# Patient Record
Sex: FEMALE | Race: BLACK OR AFRICAN AMERICAN | NOT HISPANIC OR LATINO | URBAN - METROPOLITAN AREA
[De-identification: names, ages, dates, MRNs, and addresses within clinical notes are randomized per-mention and may not be internally consistent; named-entity substitution may affect disease eponyms.]

---

## 2020-02-27 ENCOUNTER — HOSPITAL ENCOUNTER (OUTPATIENT)
Dept: URGENT CARE | Facility: CLINIC | Age: 65
Discharge: HOME | End: 2020-02-27
Attending: NURSE PRACTITIONER

## 2020-03-31 ENCOUNTER — CONVERSION ENCOUNTER (OUTPATIENT)
Dept: FAMILY MEDICINE CLINIC | Facility: CLINIC | Age: 65
End: 2020-03-31

## 2020-03-31 ENCOUNTER — OFFICE VISIT CONVERTED (OUTPATIENT)
Dept: FAMILY MEDICINE CLINIC | Facility: CLINIC | Age: 65
End: 2020-03-31
Attending: FAMILY MEDICINE

## 2020-03-31 ENCOUNTER — HOSPITAL ENCOUNTER (OUTPATIENT)
Dept: OTHER | Facility: HOSPITAL | Age: 65
Discharge: HOME | End: 2020-03-31
Attending: FAMILY MEDICINE

## 2020-03-31 LAB
25(OH)D3 SERPL-MCNC: 34 NG/ML (ref 30–100)
ALBUMIN SERPL-MCNC: 4.2 G/DL (ref 3.5–5)
ALBUMIN/GLOB SERPL: 1.4 {RATIO} (ref 1.4–2.6)
ALP SERPL-CCNC: 84 U/L (ref 43–160)
ALT SERPL-CCNC: 10 U/L (ref 10–40)
ANION GAP SERPL CALC-SCNC: 16 MMOL/L (ref 8–19)
AST SERPL-CCNC: 21 U/L (ref 15–50)
BASOPHILS # BLD AUTO: 0.01 10*3/UL (ref 0–0.2)
BASOPHILS NFR BLD AUTO: 0.1 % (ref 0–3)
BILIRUB SERPL-MCNC: 0.26 MG/DL (ref 0.2–1.3)
BUN SERPL-MCNC: 14 MG/DL (ref 5–25)
BUN/CREAT SERPL: 14 {RATIO} (ref 6–20)
CALCIUM SERPL-MCNC: 9 MG/DL (ref 8.7–10.4)
CHLORIDE SERPL-SCNC: 103 MMOL/L (ref 99–111)
CHOLEST SERPL-MCNC: 147 MG/DL (ref 107–200)
CHOLEST/HDLC SERPL: 3.3 {RATIO} (ref 3–6)
CONV ABS IMM GRAN: 0.03 10*3/UL (ref 0–0.2)
CONV CO2: 27 MMOL/L (ref 22–32)
CONV IMMATURE GRAN: 0.4 % (ref 0–1.8)
CONV TOTAL PROTEIN: 7.3 G/DL (ref 6.3–8.2)
CREAT UR-MCNC: 1.01 MG/DL (ref 0.5–0.9)
DEPRECATED RDW RBC AUTO: 45.1 FL (ref 36.4–46.3)
EOSINOPHIL # BLD AUTO: 0.08 10*3/UL (ref 0–0.7)
EOSINOPHIL # BLD AUTO: 1 % (ref 0–7)
ERYTHROCYTE [DISTWIDTH] IN BLOOD BY AUTOMATED COUNT: 13.3 % (ref 11.7–14.4)
GFR SERPLBLD BASED ON 1.73 SQ M-ARVRAT: >60 ML/MIN/{1.73_M2}
GLOBULIN UR ELPH-MCNC: 3.1 G/DL (ref 2–3.5)
GLUCOSE SERPL-MCNC: 101 MG/DL (ref 65–99)
HCT VFR BLD AUTO: 38.4 % (ref 37–47)
HDLC SERPL-MCNC: 44 MG/DL (ref 40–60)
HGB BLD-MCNC: 12.1 G/DL (ref 12–16)
LDLC SERPL CALC-MCNC: 80 MG/DL (ref 70–100)
LYMPHOCYTES # BLD AUTO: 1.07 10*3/UL (ref 1–5)
LYMPHOCYTES NFR BLD AUTO: 13.9 % (ref 20–45)
MCH RBC QN AUTO: 28.9 PG (ref 27–31)
MCHC RBC AUTO-ENTMCNC: 31.5 G/DL (ref 33–37)
MCV RBC AUTO: 91.6 FL (ref 81–99)
MONOCYTES # BLD AUTO: 0.63 10*3/UL (ref 0.2–1.2)
MONOCYTES NFR BLD AUTO: 8.2 % (ref 3–10)
NEUTROPHILS # BLD AUTO: 5.89 10*3/UL (ref 2–8)
NEUTROPHILS NFR BLD AUTO: 76.4 % (ref 30–85)
NRBC CBCN: 0 % (ref 0–0.7)
OSMOLALITY SERPL CALC.SUM OF ELEC: 295 MOSM/KG (ref 273–304)
PLATELET # BLD AUTO: 259 10*3/UL (ref 130–400)
PMV BLD AUTO: 10.5 FL (ref 9.4–12.3)
POTASSIUM SERPL-SCNC: 4.2 MMOL/L (ref 3.5–5.3)
PTH-INTACT SERPL-MCNC: 116.6 PG/ML (ref 11.1–79.5)
RBC # BLD AUTO: 4.19 10*6/UL (ref 4.2–5.4)
SODIUM SERPL-SCNC: 142 MMOL/L (ref 135–147)
T4 FREE SERPL-MCNC: 1.5 NG/DL (ref 0.9–1.8)
TRIGL SERPL-MCNC: 116 MG/DL (ref 40–150)
TSH SERPL-ACNC: 0.3 M[IU]/L (ref 0.27–4.2)
VLDLC SERPL-MCNC: 23 MG/DL (ref 5–37)
WBC # BLD AUTO: 7.71 10*3/UL (ref 4.8–10.8)

## 2020-04-22 ENCOUNTER — HOSPITAL ENCOUNTER (OUTPATIENT)
Dept: FAMILY MEDICINE CLINIC | Facility: CLINIC | Age: 65
Discharge: HOME | End: 2020-04-22
Attending: NURSE PRACTITIONER

## 2020-04-22 ENCOUNTER — OFFICE VISIT CONVERTED (OUTPATIENT)
Dept: FAMILY MEDICINE CLINIC | Facility: CLINIC | Age: 65
End: 2020-04-22
Attending: NURSE PRACTITIONER

## 2020-04-24 LAB — BACTERIA SPEC AEROBE CULT: NORMAL

## 2020-05-13 ENCOUNTER — OFFICE VISIT CONVERTED (OUTPATIENT)
Dept: FAMILY MEDICINE CLINIC | Facility: CLINIC | Age: 65
End: 2020-05-13
Attending: FAMILY MEDICINE

## 2020-06-22 ENCOUNTER — OFFICE VISIT CONVERTED (OUTPATIENT)
Dept: FAMILY MEDICINE CLINIC | Facility: CLINIC | Age: 65
End: 2020-06-22
Attending: FAMILY MEDICINE

## 2020-07-27 ENCOUNTER — HOSPITAL ENCOUNTER (OUTPATIENT)
Dept: MAMMOGRAPHY | Facility: HOSPITAL | Age: 65
Discharge: HOME | End: 2020-07-27
Attending: FAMILY MEDICINE

## 2020-07-27 LAB
ALBUMIN SERPL-MCNC: 4.1 G/DL (ref 3.5–5)
ALP SERPL-CCNC: 74 U/L (ref 43–160)
ALT SERPL-CCNC: 9 U/L (ref 10–40)
AST SERPL-CCNC: 18 U/L (ref 15–50)
BASOPHILS # BLD AUTO: 0.02 10*3/UL (ref 0–0.2)
BASOPHILS NFR BLD AUTO: 0.3 % (ref 0–3)
BILIRUB SERPL-MCNC: 0.26 MG/DL (ref 0.2–1.3)
CONV ABS IMM GRAN: 0.02 10*3/UL (ref 0–0.2)
CONV BILI, CONJUGATED: <0.2 MG/DL (ref 0–0.6)
CONV IMMATURE GRAN: 0.3 % (ref 0–1.8)
CONV TOTAL PROTEIN: 7.4 G/DL (ref 6.3–8.2)
CONV UNCONJUGATED BILIRUBIN: 0.1 MG/DL (ref 0–1.1)
DEPRECATED RDW RBC AUTO: 42.8 FL (ref 36.4–46.3)
EOSINOPHIL # BLD AUTO: 0.07 10*3/UL (ref 0–0.7)
EOSINOPHIL # BLD AUTO: 1 % (ref 0–7)
ERYTHROCYTE [DISTWIDTH] IN BLOOD BY AUTOMATED COUNT: 13 % (ref 11.7–14.4)
HCT VFR BLD AUTO: 39.9 % (ref 37–47)
HGB BLD-MCNC: 12.6 G/DL (ref 12–16)
LYMPHOCYTES # BLD AUTO: 1.57 10*3/UL (ref 1–5)
LYMPHOCYTES NFR BLD AUTO: 23.4 % (ref 20–45)
MCH RBC QN AUTO: 28.5 PG (ref 27–31)
MCHC RBC AUTO-ENTMCNC: 31.6 G/DL (ref 33–37)
MCV RBC AUTO: 90.3 FL (ref 81–99)
MONOCYTES # BLD AUTO: 0.64 10*3/UL (ref 0.2–1.2)
MONOCYTES NFR BLD AUTO: 9.5 % (ref 3–10)
NEUTROPHILS # BLD AUTO: 4.4 10*3/UL (ref 2–8)
NEUTROPHILS NFR BLD AUTO: 65.5 % (ref 30–85)
NRBC CBCN: 0 % (ref 0–0.7)
PLATELET # BLD AUTO: 274 10*3/UL (ref 130–400)
PMV BLD AUTO: 10.6 FL (ref 9.4–12.3)
RBC # BLD AUTO: 4.42 10*6/UL (ref 4.2–5.4)
WBC # BLD AUTO: 6.72 10*3/UL (ref 4.8–10.8)

## 2020-08-03 ENCOUNTER — HOSPITAL ENCOUNTER (OUTPATIENT)
Dept: MAMMOGRAPHY | Facility: HOSPITAL | Age: 65
Discharge: HOME | End: 2020-08-03
Attending: FAMILY MEDICINE

## 2020-08-14 ENCOUNTER — HOSPITAL ENCOUNTER (OUTPATIENT)
Dept: ULTRASOUND IMAGING | Facility: HOSPITAL | Age: 65
Discharge: HOME | End: 2020-08-14
Attending: FAMILY MEDICINE

## 2020-09-18 ENCOUNTER — HOSPITAL ENCOUNTER (OUTPATIENT)
Dept: FAMILY MEDICINE CLINIC | Facility: CLINIC | Age: 65
Discharge: HOME | End: 2020-09-18
Attending: FAMILY MEDICINE

## 2020-09-18 LAB
ALBUMIN SERPL-MCNC: 4.1 G/DL (ref 3.5–5)
ALBUMIN/GLOB SERPL: 1.3 {RATIO} (ref 1.4–2.6)
ALP SERPL-CCNC: 88 U/L (ref 43–160)
ALT SERPL-CCNC: 11 U/L (ref 10–40)
ANION GAP SERPL CALC-SCNC: 14 MMOL/L (ref 8–19)
AST SERPL-CCNC: 22 U/L (ref 15–50)
BILIRUB SERPL-MCNC: 0.25 MG/DL (ref 0.2–1.3)
BUN SERPL-MCNC: 9 MG/DL (ref 5–25)
BUN/CREAT SERPL: 9 {RATIO} (ref 6–20)
CALCIUM SERPL-MCNC: 9.4 MG/DL (ref 8.7–10.4)
CHLORIDE SERPL-SCNC: 101 MMOL/L (ref 99–111)
CONV CO2: 29 MMOL/L (ref 22–32)
CONV TOTAL PROTEIN: 7.2 G/DL (ref 6.3–8.2)
CREAT UR-MCNC: 1.05 MG/DL (ref 0.5–0.9)
GFR SERPLBLD BASED ON 1.73 SQ M-ARVRAT: >60 ML/MIN/{1.73_M2}
GLOBULIN UR ELPH-MCNC: 3.1 G/DL (ref 2–3.5)
GLUCOSE SERPL-MCNC: 91 MG/DL (ref 65–99)
OSMOLALITY SERPL CALC.SUM OF ELEC: 286 MOSM/KG (ref 273–304)
POTASSIUM SERPL-SCNC: 4.5 MMOL/L (ref 3.5–5.3)
SODIUM SERPL-SCNC: 139 MMOL/L (ref 135–147)

## 2020-09-22 ENCOUNTER — OFFICE VISIT CONVERTED (OUTPATIENT)
Dept: FAMILY MEDICINE CLINIC | Facility: CLINIC | Age: 65
End: 2020-09-22
Attending: FAMILY MEDICINE

## 2020-09-30 ENCOUNTER — HOSPITAL ENCOUNTER (OUTPATIENT)
Dept: OTHER | Facility: HOSPITAL | Age: 65
Discharge: HOME | End: 2020-09-30

## 2020-09-30 LAB
ALBUMIN SERPL-MCNC: 3.7 G/DL (ref 3.5–5)
ALP SERPL-CCNC: 74 U/L (ref 43–160)
ALT SERPL-CCNC: 11 U/L (ref 10–40)
AST SERPL-CCNC: 14 U/L (ref 15–50)
BASOPHILS # BLD AUTO: 0.02 10*3/UL (ref 0–0.2)
BASOPHILS NFR BLD AUTO: 0.2 % (ref 0–3)
BILIRUB SERPL-MCNC: 0.2 MG/DL (ref 0.2–1.3)
CONV ABS IMM GRAN: 0.07 10*3/UL (ref 0–0.2)
CONV BILI, CONJUGATED: <0.2 MG/DL (ref 0–0.6)
CONV IMMATURE GRAN: 0.8 % (ref 0–1.8)
CONV TOTAL PROTEIN: 6.6 G/DL (ref 6.3–8.2)
CONV UNCONJUGATED BILIRUBIN: 0 MG/DL (ref 0–1.1)
DEPRECATED RDW RBC AUTO: 42 FL (ref 36.4–46.3)
EOSINOPHIL # BLD AUTO: 0.11 10*3/UL (ref 0–0.7)
EOSINOPHIL # BLD AUTO: 1.3 % (ref 0–7)
ERYTHROCYTE [DISTWIDTH] IN BLOOD BY AUTOMATED COUNT: 12.7 % (ref 11.7–14.4)
HCT VFR BLD AUTO: 39.3 % (ref 37–47)
HGB BLD-MCNC: 12.4 G/DL (ref 12–16)
LYMPHOCYTES # BLD AUTO: 2.08 10*3/UL (ref 1–5)
LYMPHOCYTES NFR BLD AUTO: 24.3 % (ref 20–45)
MCH RBC QN AUTO: 28.4 PG (ref 27–31)
MCHC RBC AUTO-ENTMCNC: 31.6 G/DL (ref 33–37)
MCV RBC AUTO: 90.1 FL (ref 81–99)
MONOCYTES # BLD AUTO: 0.98 10*3/UL (ref 0.2–1.2)
MONOCYTES NFR BLD AUTO: 11.5 % (ref 3–10)
NEUTROPHILS # BLD AUTO: 5.29 10*3/UL (ref 2–8)
NEUTROPHILS NFR BLD AUTO: 61.9 % (ref 30–85)
NRBC CBCN: 0 % (ref 0–0.7)
PLATELET # BLD AUTO: 316 10*3/UL (ref 130–400)
PMV BLD AUTO: 10 FL (ref 9.4–12.3)
RBC # BLD AUTO: 4.36 10*6/UL (ref 4.2–5.4)
WBC # BLD AUTO: 8.55 10*3/UL (ref 4.8–10.8)

## 2020-10-27 ENCOUNTER — OFFICE VISIT CONVERTED (OUTPATIENT)
Dept: FAMILY MEDICINE CLINIC | Facility: CLINIC | Age: 65
End: 2020-10-27
Attending: FAMILY MEDICINE

## 2021-01-20 ENCOUNTER — HOSPITAL ENCOUNTER (OUTPATIENT)
Dept: FAMILY MEDICINE CLINIC | Facility: CLINIC | Age: 66
Discharge: HOME | End: 2021-01-20
Attending: FAMILY MEDICINE

## 2021-01-20 LAB
25(OH)D3 SERPL-MCNC: 35.5 NG/ML (ref 30–100)
ALBUMIN SERPL-MCNC: 4.2 G/DL (ref 3.5–5)
ALBUMIN/GLOB SERPL: 1.4 {RATIO} (ref 1.4–2.6)
ALP SERPL-CCNC: 78 U/L (ref 43–160)
ALT SERPL-CCNC: 11 U/L (ref 10–40)
ANION GAP SERPL CALC-SCNC: 14 MMOL/L (ref 8–19)
AST SERPL-CCNC: 22 U/L (ref 15–50)
BASOPHILS # BLD AUTO: 0.02 10*3/UL (ref 0–0.2)
BASOPHILS NFR BLD AUTO: 0.3 % (ref 0–3)
BILIRUB SERPL-MCNC: 0.32 MG/DL (ref 0.2–1.3)
BUN SERPL-MCNC: 13 MG/DL (ref 5–25)
BUN/CREAT SERPL: 13 {RATIO} (ref 6–20)
CALCIUM SERPL-MCNC: 9.2 MG/DL (ref 8.7–10.4)
CHLORIDE SERPL-SCNC: 103 MMOL/L (ref 99–111)
CHOLEST SERPL-MCNC: 143 MG/DL (ref 107–200)
CHOLEST/HDLC SERPL: 3.1 {RATIO} (ref 3–6)
CONV ABS IMM GRAN: 0.02 10*3/UL (ref 0–0.2)
CONV CO2: 27 MMOL/L (ref 22–32)
CONV IMMATURE GRAN: 0.3 % (ref 0–1.8)
CONV TOTAL PROTEIN: 7.1 G/DL (ref 6.3–8.2)
CREAT UR-MCNC: 1.01 MG/DL (ref 0.5–0.9)
DEPRECATED RDW RBC AUTO: 41.1 FL (ref 36.4–46.3)
EOSINOPHIL # BLD AUTO: 0.08 10*3/UL (ref 0–0.7)
EOSINOPHIL # BLD AUTO: 1.3 % (ref 0–7)
ERYTHROCYTE [DISTWIDTH] IN BLOOD BY AUTOMATED COUNT: 12.7 % (ref 11.7–14.4)
GFR SERPLBLD BASED ON 1.73 SQ M-ARVRAT: >60 ML/MIN/{1.73_M2}
GLOBULIN UR ELPH-MCNC: 2.9 G/DL (ref 2–3.5)
GLUCOSE SERPL-MCNC: 95 MG/DL (ref 65–99)
HCT VFR BLD AUTO: 40.5 % (ref 37–47)
HDLC SERPL-MCNC: 46 MG/DL (ref 40–60)
HGB BLD-MCNC: 12.9 G/DL (ref 12–16)
LDLC SERPL CALC-MCNC: 73 MG/DL (ref 70–100)
LYMPHOCYTES # BLD AUTO: 1.21 10*3/UL (ref 1–5)
LYMPHOCYTES NFR BLD AUTO: 19.4 % (ref 20–45)
MCH RBC QN AUTO: 28.2 PG (ref 27–31)
MCHC RBC AUTO-ENTMCNC: 31.9 G/DL (ref 33–37)
MCV RBC AUTO: 88.6 FL (ref 81–99)
MONOCYTES # BLD AUTO: 0.5 10*3/UL (ref 0.2–1.2)
MONOCYTES NFR BLD AUTO: 8 % (ref 3–10)
NEUTROPHILS # BLD AUTO: 4.4 10*3/UL (ref 2–8)
NEUTROPHILS NFR BLD AUTO: 70.7 % (ref 30–85)
NRBC CBCN: 0 % (ref 0–0.7)
OSMOLALITY SERPL CALC.SUM OF ELEC: 290 MOSM/KG (ref 273–304)
PLATELET # BLD AUTO: 277 10*3/UL (ref 130–400)
PMV BLD AUTO: 11.4 FL (ref 9.4–12.3)
POTASSIUM SERPL-SCNC: 4.1 MMOL/L (ref 3.5–5.3)
RBC # BLD AUTO: 4.57 10*6/UL (ref 4.2–5.4)
SODIUM SERPL-SCNC: 140 MMOL/L (ref 135–147)
T4 FREE SERPL-MCNC: 1.6 NG/DL (ref 0.9–1.8)
TRIGL SERPL-MCNC: 118 MG/DL (ref 40–150)
TSH SERPL-ACNC: 0.06 M[IU]/L (ref 0.27–4.2)
VLDLC SERPL-MCNC: 24 MG/DL (ref 5–37)
WBC # BLD AUTO: 6.23 10*3/UL (ref 4.8–10.8)

## 2021-01-22 LAB
A FUMIGATUS AB SER QL ID: <0.1 K[IU]/ML
AMER SYCAMORE IGE QN: <0.1 K[IU]/ML
BERMUDA GRASS IGE QN: <0.1 K[IU]/ML (ref 0–0.35)
BOXELDER IGE QN: <0.1 K[IU]/ML
CALIF WALNUT POLN IGE QN: <0.1 K[IU]/ML (ref 0–0.35)
CAT DANDER IGG QN: <0.1 K[IU]/ML (ref 0–0.35)
CLADOSPORIUM IGE: <0.1 K[IU]/ML
CMN PIGWEED IGE QN: <0.1 K[IU]/ML
COMMON RAGWEED IGE QN: <0.1 K[IU]/ML (ref 0–0.35)
COTTONWOOD IGE QN: <0.1 K[IU]/ML
D FARINAE IGE QN: <0.1 K[IU]/ML (ref 0–0.35)
D PTERONYSS IGE QN: <0.1 K[IU]/ML (ref 0–0.35)
DOG DANDER IGE QN: <0.1 K[IU]/ML (ref 0–0.35)
GOOSEFOOT IGE QN: <0.1 K[IU]/ML (ref 0–0.35)
IGE SERPL-ACNC: 42 K[IU]/ML (ref 0–24)
IMMUNOCAP RESULT: ABNORMAL (ref 0–0)
JOHNSON GRASS IGE QN: <0.1 K[IU]/ML (ref 0–0.35)
MEADOW FESCUE IGE QN: <0.1 K[IU]/ML (ref 0–0.35)
MOLD IGE: <0.1 K[IU]/ML (ref 0–0.35)
MOUSE URINE PROT IGE QN: <0.1 K[IU]/ML
MT JUNIPER IGE QN: <0.1 K[IU]/ML
OAK DUST IGE QN: <0.1 K[IU]/ML (ref 0–0.35)
P NOTATUM IGE QN: <0.1 K[IU]/ML
PECAN/HICK TREE IGE QN: <0.1 K[IU]/ML (ref 0–0.35)
ROACH IGE QN: <0.1 K[IU]/ML (ref 0–0.35)
TIMOTHY IGE QN: <0.1 K[IU]/ML
WHITE ASH IGE QN: <0.1 K[IU]/ML
WHITE BIRCH IGE QN: <0.1 K[IU]/ML (ref 0–0.35)
WHITE ELM IGE QN: <0.1 K[IU]/ML (ref 0–0.35)
WHITE MULBERRY IGE QN: <0.1 K[IU]/ML

## 2021-01-25 ENCOUNTER — OFFICE VISIT CONVERTED (OUTPATIENT)
Dept: FAMILY MEDICINE CLINIC | Facility: CLINIC | Age: 66
End: 2021-01-25
Attending: FAMILY MEDICINE

## 2021-01-25 ENCOUNTER — CONVERSION ENCOUNTER (OUTPATIENT)
Dept: FAMILY MEDICINE CLINIC | Facility: CLINIC | Age: 66
End: 2021-01-25

## 2021-01-25 ENCOUNTER — HOSPITAL ENCOUNTER (OUTPATIENT)
Dept: OTHER | Facility: HOSPITAL | Age: 66
Discharge: HOME | End: 2021-01-25
Attending: STUDENT IN AN ORGANIZED HEALTH CARE EDUCATION/TRAINING PROGRAM

## 2021-01-25 LAB
ALBUMIN SERPL-MCNC: 4 G/DL (ref 3.5–5)
ALP SERPL-CCNC: 76 U/L (ref 43–160)
ALT SERPL-CCNC: 9 U/L (ref 10–40)
AST SERPL-CCNC: 18 U/L (ref 15–50)
BASOPHILS # BLD AUTO: 0.02 10*3/UL (ref 0–0.2)
BASOPHILS NFR BLD AUTO: 0.3 % (ref 0–3)
BILIRUB SERPL-MCNC: 0.23 MG/DL (ref 0.2–1.3)
CONV ABS IMM GRAN: 0.02 10*3/UL (ref 0–0.2)
CONV BILI, CONJUGATED: <0.2 MG/DL (ref 0–0.6)
CONV IMMATURE GRAN: 0.3 % (ref 0–1.8)
CONV TOTAL PROTEIN: 6.8 G/DL (ref 6.3–8.2)
CONV UNCONJUGATED BILIRUBIN: 0 MG/DL (ref 0–1.1)
DEPRECATED RDW RBC AUTO: 42.1 FL (ref 36.4–46.3)
EOSINOPHIL # BLD AUTO: 0.11 10*3/UL (ref 0–0.7)
EOSINOPHIL # BLD AUTO: 1.6 % (ref 0–7)
ERYTHROCYTE [DISTWIDTH] IN BLOOD BY AUTOMATED COUNT: 12.8 % (ref 11.7–14.4)
HCT VFR BLD AUTO: 38.7 % (ref 37–47)
HGB BLD-MCNC: 12.1 G/DL (ref 12–16)
LYMPHOCYTES # BLD AUTO: 1.57 10*3/UL (ref 1–5)
LYMPHOCYTES NFR BLD AUTO: 23.1 % (ref 20–45)
MCH RBC QN AUTO: 28.3 PG (ref 27–31)
MCHC RBC AUTO-ENTMCNC: 31.3 G/DL (ref 33–37)
MCV RBC AUTO: 90.4 FL (ref 81–99)
MONOCYTES # BLD AUTO: 0.71 10*3/UL (ref 0.2–1.2)
MONOCYTES NFR BLD AUTO: 10.5 % (ref 3–10)
NEUTROPHILS # BLD AUTO: 4.36 10*3/UL (ref 2–8)
NEUTROPHILS NFR BLD AUTO: 64.2 % (ref 30–85)
NRBC CBCN: 0 % (ref 0–0.7)
PLATELET # BLD AUTO: 274 10*3/UL (ref 130–400)
PMV BLD AUTO: 11 FL (ref 9.4–12.3)
RBC # BLD AUTO: 4.28 10*6/UL (ref 4.2–5.4)
WBC # BLD AUTO: 6.79 10*3/UL (ref 4.8–10.8)

## 2021-03-26 ENCOUNTER — HOSPITAL ENCOUNTER (OUTPATIENT)
Dept: FAMILY MEDICINE CLINIC | Facility: CLINIC | Age: 66
Discharge: HOME | End: 2021-03-26
Attending: FAMILY MEDICINE

## 2021-03-26 LAB
T4 FREE SERPL-MCNC: 1.2 NG/DL (ref 0.9–1.8)
TSH SERPL-ACNC: 0.38 M[IU]/L (ref 0.27–4.2)

## 2021-05-10 NOTE — H&P
History and Physical      Patient Name: Marija Miller   Patient ID: [id number]   Sex: Female   YOB: 1955        Visit Date: March 31, 2020    Provider: Nathan Casey DO   Location: [facility]   Location Address: [address] [place] [address], [address] [place] [address]  [id number]   Location Phone: [phone]          Chief Complaint  · establish care      History Of Present Illness  Marija Miller is a 65 year old /Black female who presents for evaluation and treatment of:      Patient presents today to establish care.  Her past medical history is significant for hypothyroidism, hyperparathyroidism, vitamin D deficiency, rheumatoid arthritis, acid reflux, hypercholesterolemia, and gallstones.  She also has had a couple recent ear infections within the past few months.  She reports that her right ear hurts and feels full.  She denies any fever or chills.  She was diagnosed with otitis media last month and placed on amoxicillin which has helped.  She is requesting referral for rheumatoid arthritis.  She does travel a lot with her family who is .  Her daughter and son-in-law are .  She hopefully plans to be in the [facility] for the next couple years.  She moved here in August 2019.  She has previously been in [place] and then [place].  She reports that she was diagnosed with hyperparathyroidism back in [place].  She did go see HOLDEN Osullivan locally.  She was requested to have further evaluation for hyperparathyroidism.  She has not done this yet due to concerns about cost.  She does have osteoporosis which can certainly be a consequence of untreated hyperparathyroidism.  Reviewing her labs her calcium level was normal.  She does take a calcium and vitamin D supplementation.  She does take residual night once monthly for osteoporosis.  She is supposed to have a DEXA scan this year however given the circumstances with the coronavirus we discussed holding off on routine  testing.  She does need labs.  Her last TSH was slightly elevated around 5 with a normal free T4.  She is now taking 88 mcg of Synthroid daily and tolerating it well.  She does have elevated blood pressure today.  Denies any history of hypertension.  Discussed monitoring for now.  She did have a colonoscopy in 2017 and needs a follow-up in 2022.  She does need a Pap smear in 2023.  She complains of some pain on the dorsal aspect of her left index finger that has been on and off for 2 weeks.  She denies any injury.  It is not at the MCP joint area nor the PIP joint area but in between.  No lesions noted.       Past Medical History  Allergies; Arthritis; Cervical cancer screening; Gall Stones; Hyperlipemia; Thyroid Problems         Past Surgical History  Colonoscopy         Medication List  atorvastatin 20 mg oral tablet; Calcium 500 oral; Claritin 10 mg oral tablet; lansoprazole 30 mg oral capsule,delayed release(DR/EC); risedronate 150 mg oral tablet; Synthroid 88 mcg oral tablet; Vitamin D3 25 mcg (1,000 unit) oral capsule; Xeljanz XR 11 mg oral tablet extended release 24 hr         Allergy List  SULFA (SULFONAMIDES)       Allergies Reconciled  Social History  Tobacco (Former)         Review of Systems     General: Denies any fever, chills, weight changes, or night sweats  HEENT: Seasonal allergies  Cardiovascular: Denies any chest pain or palpitations  respiratory: Denies any cough or wheezing. Denies any shortness of breath  Gastrointestinal: Acid reflux  Extremities: Denies any edema  Psychiatric: Denies any changes in mood or affect  Neurologic: Denies any neurologic deficits  skin: Denies any rashes or lesions.  endocrine: Denies any weight loss, fever, night sweats  Musculoskeletal: As described above       Vitals  Date Time BP Position Site L\R Cuff Size HR RR TEMP (F) WT  HT  BMI kg/m2 BSA m2 O2 Sat        03/31/2020 09:12 /84 Sitting    92 - R  97.9 189lbs 6oz 5'   36.98 1.91 96 %          Physical  Examination     General: AAO 3, no acute distress, pleasant  HEENT: Normocephalic, atraumatic, there is cerumen impaction of the right EAC.  This was attempted to be removed by irrigation per nursing staff however patient had some dizziness and did not tolerate well.  Some but not always removed.  The left EAC is intact with the TM intact as well.  No signs of infection  Cardiovascular: Regular rate and rhythm without appreciable murmur  Respiratory: Clear to auscultation bilaterally no RRW  Gastrointestinal: Soft nontender nondistended with bowel sounds present  Musculoskeletal: Left index finger does not demonstrate any abnormal lesions.  No palpable abnormality.  She has good range of motion of the MCP joint and the PIP joint of the index finger.  extremities: No clubbing, cyanosis or edema  Neurologic: CN II through XII grossly intact   Psychiatric: Normal mood and affect               Assessment  · Osteoporosis     733.00/M81.0  · Vitamin D deficiency     268.9/E55.9  · Screening for depression     V79.0/Z13.89  · Need for influenza vaccination     V04.81/Z23  · Rheumatoid arthritis     714.0/M06.9  · Hyperparathyroidism     252.00/E21.3  · HLD (hyperlipidemia)     272.4/E78.5  · Medication monitoring encounter     V58.83/Z51.81  · Impacted cerumen of right ear     380.4/H61.21  · Hypothyroid     244.9/E03.9  · Elevated BP without diagnosis of hypertension     796.2/R03.0  · Finger pain, left     729.5/M79.645    Problems Reconciled  Plan  · Orders  o CBC with Auto Diff [facility] (57751) - 714.0/M06.9, V58.83/Z51.81 - 03/31/2020  o CMP [facility] (72912) - 714.0/M06.9, V58.83/Z51.81 - 03/31/2020  o Lipid Panel [facility] (07985) - 272.4/E78.5 - 03/31/2020  o Thyroid Profile (55959, 19623, THYII) - 244.9/E03.9 - 03/31/2020  o Vitamin D (25-Hydroxy) Level (46923) - 268.9/E55.9 - 03/31/2020  o ACO-39: Current medications updated and reviewed () - - 03/31/2020  o ACO-18: Negative screen for clinical depression using a  standardized tool () - V79.0/Z13.89 - 03/31/2020  o RHEUMATOLOGY CONSULTATION (RHEUM) - 714.0/M06.9 - 03/31/2020   [facility] [address] Elsi Reyes [address], [address], [place] [address] Phone: [phone]  Fax: [phone]  o PTH (48419) - 252.00/E21.3 - 03/31/2020  o Cerumen Removal by MA or Nurse, Unilateral [facility] (08637) - - 03/31/2020   Right ear irrigation with hydrogen peroxide and water. Visualized tympanic membrane. Performed by Heidy Vazquez  · Medications  o Voltaren 1 % topical gel   SIG: apply 2 grams to the affected area(s) by topical route 4 times per day   DISP: (1) 100 gm tube with 0 refills  Prescribed on 03/31/2020     o Medications have been Reconciled  o Transition of Care or Provider Policy  · Instructions  o Depression Screen completed and scanned into the EMR under the designated folder within the patient's documents.  o Today's PHQ-9 result is _0__  o Patient was educated/instructed on their diagnosis, treatment and medications prior to discharge from the clinic today.  o Patient instructed to seek medical attention urgently for new or worsening symptoms.  o Call the office with any concerns or questions.  o I discussed with patient her diagnosis and history of hyperparathyroidism. I will check calcium level as well as repeat her PTH. If it is still elevated I would certainly encourage her to follow-up with ENT. She does have cerumen that was partially removed today but not completely. I instructed her to use hydroperoxide and water to help with taking care of the cerumen. If she still has issues she may follow-up with ENT as well to have cerumen removed. I do not see signs of otitis media today in the left ear. I have made the rheumatology consultation as requested per patient. I will give her some Voltaren gel to see if this helps improve her index finger pain symptoms which is on and off. If symptoms continue to persist may consider imaging at that  time however no reported source of injury.  o Depression screening has been reviewed today and it is negative.  · Disposition  o Call or Return if symptoms worsen or persist.  o Follow Up in 1 month.            Electronically Signed by: Nathan Casey DO - on March 31, 2020 02:30:51 PM

## 2021-05-12 NOTE — PROGRESS NOTES
Progress Note      Patient Name: Marija Miller   Patient ID: [id number]   Sex: Female   YOB: 1955        Visit Date: April 22, 2020    Provider: HOLDEN Young   Location: [facility]   Location Address: [address] [place] [address], [address] [place] [address]  [id number]   Location Phone: [phone]          Chief Complaint  · THROAT PAIN  · POSS L EAR INFECTION      History Of Present Illness  Marija Miller is a 65 year old /Black female who presents for evaluation and treatment of:      Presents today for an acute visit for sore throat and left ear pain.  She reports having a sore throat x2 days.  She has sinus pressure and a postnasal drip.  Denies fever, chills, body aches.  She reports having left ear pain.  She was seen approximately 1 month ago with left ear pain which appeared normal and has been previously treated for left otitis media at other clinics.  She has seasonal allergies.  Has been taking Claritin 10 mg for years.       Past Medical History  Allergies; Arthritis; Cervical cancer screening; Gall Stones; Hyperlipemia; Thyroid Problems         Past Surgical History  Colonoscopy         Medication List  atorvastatin 20 mg oral tablet; Calcium 500 oral; lansoprazole 30 mg oral capsule,delayed release(DR/EC); risedronate 150 mg oral tablet; Synthroid 88 mcg oral tablet; Vitamin D3 25 mcg (1,000 unit) oral capsule; Voltaren 1 % topical gel; Xeljanz XR 11 mg oral tablet extended release 24 hr         Allergy List  SULFA (SULFONAMIDES)         Social History  Tobacco (Former)         Review of Systems  · Constitutional  o Denies  o : fatigue, fever, chills, body aches, night sweats  · Eyes  o Denies  o : double vision, blurred vision  · HENT  o Admits  o : headaches, sinus pain, postnasal drip, sore throat, ear pain, ear fullness  o Denies  o : vertigo, lightheadedness, recent head injury, nasal congestion  · Cardiovascular  o Denies  o : lower extremity edema, claudication, chest  pressure, palpitations  · Respiratory  o Denies  o : shortness of breath, wheezing, cough, productive cough  · Gastrointestinal  o Admits  o : loss of appetite  o Denies  o : nausea, vomiting, diarrhea, constipation      Vitals  Date Time BP Position Site L\R Cuff Size HR RR TEMP (F) WT  HT  BMI kg/m2 BSA m2 O2 Sat        04/22/2020 04:21 /76 Sitting    93 - R  98.1 187lbs 4oz 5'   36.57 1.9 97 %          Physical Examination  · Constitutional  o Appearance  o : alert, in no acute distress  · Head and Face  o Head  o :   § Inspection  § : atraumatic, normocephalic  o Face  o :   § Inspection  § : no facial lesions  o HEENT  o : Unremarkable  · Eyes  o Conjunctivae  o : conjunctivae normal  o Sclerae  o : sclerae white  o Pupils and Irises  o : pupils equal and round, pupils reactive to light bilaterally  o Eyelids/Ocular Adnexae  o : eyelid appearance normal  · Ears, Nose, Mouth and Throat  o Ears  o :   § External Ears  § : appearance within normal limits, no lesions present  § Otoscopic Examination  § : tympanic membrane appearance within normal limits bilaterally  o Nose  o :   § External Nose  § : appearance normal  o Oral Cavity  o :   § Oral Mucosa  § : oral mucosa normal  § Lips  § : lip appearance normal  § Teeth  § : normal dentition for age  § Gums  § : gums pink, non-swollen, no bleeding present  § Tongue  § : tongue appearance normal  § Palate  § : hard palate normal, soft palate appearance normal  o Throat  o : erythema, tonsils +2  · Neck  o Inspection/Palpation  o : normal appearance, no masses or tenderness, trachea midline  o Thyroid  o : gland size normal, nontender, no nodules or masses present on palpation  · Respiratory  o Respiratory Effort  o : breathing unlabored  o Auscultation of Lungs  o : normal breath sounds  · Cardiovascular  o Heart  o :   § Auscultation of Heart  § : regular rate, normal rhythm, no murmurs present  o Peripheral Vascular System  o :   § Extremities  § : no  edema  · Lymphatic  o Neck  o : no lymphadenopathy   o Supraclavicular Nodes  o : no supraclavicular nodes          Results  · In-Office Procedures  o Lab procedure  § IOP - Rapid Strep (35730)   § Beta Strep Gp A Culture: Negative   § Internal Control Verified?: Yes       Assessment  · Allergic rhinitis due to allergen     477.9/J30.9  Change Claritin 10 mg to Allegra 180 mg daily. Start Flonase 2 sprays per nare daily.  · Pharyngitis, acute     462/J02.9  Send throat swab for culture and sensitivity. Discussed symptomatic treatment including throat lozenges and warm fluids to soothe throat. Kenalog 40 mg IM today in office.    Problems Reconciled  Plan  · Orders  o Throat culture and sensitivity (84915) - 462/J02.9 - 04/22/2020  o ACO-39: Current medications updated and reviewed () - - 04/22/2020  o 4.00 - Kenalog Injection 40mg (-7) - - 04/22/2020   Injection - Kenalog 40 mg; Dose: 40 mg; Site: Right Gluteus; Route: intramuscular; Date: 04/22/2020 17:05:42; Exp: 11/01/2021; Lot: [id number]; Mfg: AMNEAL [employer]ogMetal; TradeName: triamcinolone; Location: [facility]; Administered By: Jennifer Talbert MA; Comment: PT TOLERATED WELL, STABLE CONDITION  · Medications  o fluticasone propionate 50 mcg/actuation nasal spray,suspension   SIG: spray 2 sprays in each nostril by intranasal route once daily   DISP: (1) 9.9 ml aer w/adap with 2 refills  Prescribed on 04/22/2020     o fexofenadine 180 mg oral tablet   SIG: take 1 tablet (180 mg) by oral route once daily for 90 days   DISP: (90) tablets with 1 refills  Prescribed on 04/22/2020     o Claritin 10 mg oral tablet   SIG: take 1 tablet (10 mg) by oral route once daily   DISP: (0) tablet with 0 refills  Discontinued on 04/22/2020     o Medications have been Reconciled  o Transition of Care or Provider Policy  · Instructions  o Rest. Increase Fluids.  o Patient was educated/instructed on their diagnosis, treatment and medications prior to discharge from the clinic  today.  o Patient instructed to seek medical attention urgently for new or worsening symptoms.  o Call the office with any concerns or questions.  · Disposition  o Call or Return if symptoms worsen or persist.            Electronically Signed by: HOLDEN Young -Author on April 23, 2020 07:37:24 AM   - COVID PCR + complaining of cough  - No supplemental O2 needed; Sp02 >94% on RA w/o respiratory distress  - No role for decadron/ remdesivir at this time   - Tylenol, robitussin, Albuterol PRN   - Blood cultures NTD - COVID19 PCR + complaining of cough, malaise   - No supplemental O2 requirement   - No Decadron/ Remdesivir indicated at this time   - Tylenol, robitussin, Albuterol PRN   - Blood cultures NTD - On admission: K+ was 6 -> s/p insulin, dextrose, Lokelma   - Patient now with hypokalemia, not symptomatic, will give one dose K 20 mg po  - Follow BMP and replace as needed

## 2021-05-13 NOTE — PROGRESS NOTES
Progress Note      Patient Name: Marija Miller   Patient ID: [id number]   Sex: Female   YOB: 1955    Primary Care Provider: Nathan Casey DO   Referring Provider: Nathan Casey DO    Visit Date: October 27, 2020    Provider: Nathan Casey DO   [facility]   Location Address: [address] [place] [address], [address]  [place], [place]  [id number]   Location Phone: [phone]          Chief Complaint  · follow up er       History Of Present Illness  Marija Miller is a 65 year old /Black female who presents for evaluation and treatment of:      Patient presents today for an emergency room follow-up visit.  She reports having pain and weakness on the left side of her face and was concerned about strokelike symptoms.  She went to the emergency room on 10/25/2020.  Symptoms began 3 days prior to this.  There was concern about stroke so she did have a head CT which was unremarkable except for innumerable bilateral punctate hyperdensity seen in the subcutaneous scalp which may represent none specific foci of mineralization with tiny foreign bodies not being excluded.  There may be involvement of superficial portions of the bilateral face.  She denies any white bumps on her skin that she has noticed.  She denies any pain or discomfort in the scalp area.  She has a hard time closing her left eye.  She has left-sided weakness on her face.  She was diagnosed with Bell's palsy and placed on appropriate treatment including acyclovir as well as prednisone for 10 and 7 days, respectively.  She is taking 60 mg of prednisone daily and 405 times a day of acyclovir.  She also has over-the-counter Lacri-Lube that she is using and she has an eye patch.  She reports symptoms are slightly better but she still has blurred vision on the left side.  Discussed with her referral to ophthalmology to evaluate for left eye involvement.       Past Medical History  Allergies; Arthritis; Cervical  cancer screening; Gall Stones; Hyperlipemia; Thyroid Problems         Past Surgical History  Colonoscopy         Medication List  Allegra Allergy 60 mg oral tablet; atorvastatin 20 mg oral tablet; Calcium 500 oral; fexofenadine 180 mg oral tablet; fluticasone propionate 50 mcg/actuation nasal spray,suspension; ipratropium bromide 0.03 % nasal spray,non-aerosol; lansoprazole 30 mg oral capsule,delayed release(DR/EC); montelukast 10 mg oral tablet; prednisone 20 mg oral tablet; risedronate 150 mg oral tablet; Synthroid 88 mcg oral tablet; Vitamin D3 25 mcg (1,000 unit) oral capsule; Xeljanz XR 11 mg oral tablet extended release 24 hr         Allergy List  SULFA (SULFONAMIDES)         Social History  Tobacco (Former)         Review of Systems     Gen: Denies any fever, chills, or weight changes  HEENT: Denies any changes in vision or hearing, denies nasal congestion  Extremities: Denies edema  Psychiatric: Denies any changes in mood or affect  Neurologic: As discussed above  Skin: As discussed above       Vitals  Date Time BP Position Site L\R Cuff Size HR RR TEMP (F) WT  HT  BMI kg/m2 BSA m2 O2 Sat FR L/min FiO2        10/27/2020 08:13 /82 Sitting    93 - R  97.3 174lbs 0oz 5'   33.98 1.83 96 %            Physical Examination     General: AAO 3, no acute distress, pleasant  HEENT: Normocephalic, atraumatic  Cardiovascular: Regular rate and rhythm without appreciable murmur  Respiratory: Clear to auscultation bilaterally no RRW  Gastrointestinal: Soft nontender nondistended with bowel sounds present  extremities: No clubbing, cyanosis or edema  Neurologic: Weakness on the left side of her face.  She is able to close her left eye completely however there is weakness compared to the right side.  There is a slight facial droop on the left side as well when she smiles.  Skin: No cutaneous calcifications noted in the scalp.   Psychiatric: Normal mood and affect           Assessment  · Bell's  palsy     351.0/G51.0  · Vision changes, left eye     368.9/H53.9  Discussed with patient continue current management of Bell's palsy. We will work on referral to ophthalmology. I will see patient back for her next regularly scheduled appointment or sooner if needed.  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 She denies any discomfort with the calcifications and she was not aware of them prior to me mentioning them today. Reviewing the CT it appears that they are for the most part in the subcutaneous and are not causing symptoms. Discussed observation. I discussed with her that it may be secondary to her history of autoimmune disease, including rheumatoid arthritis which she reports is well controlled at this time.      Plan  · Orders  o ACO-39: Current medications updated and reviewed (1159F, ) - - 10/27/2020  o ACO-14: Influenza immunization administered or previously received [facility] () - - 10/27/2020  o OPHTHALMOLOGY CONSULTATION (OPHTH) - 351.0/G51.0, 368.9/H53.9 - 10/27/2020  · Medications  o Medications have been Reconciled  o Transition of Care or Provider Policy  · Instructions  o Patient was educated/instructed on their diagnosis, treatment and medications prior to discharge from the clinic today.  o Patient instructed to seek medical attention urgently for new or worsening symptoms.  o Call the office with any concerns or questions.  · Disposition  o Call or Return if symptoms worsen or persist.  o Keep next appointment            Electronically Signed by: Nathan Casey, DO -Author on October 27, 2020 08:43:06 AM

## 2021-05-13 NOTE — PROGRESS NOTES
Progress Note      Patient Name: Marija Miller   Patient ID: [id number]   Sex: Female   YOB: 1955    Primary Care Provider: Nathan Casey DO    Visit Date: June 22, 2020    Provider: Nathan Casey DO   Location: [facility]   Location Address: [address] [place] [address], [address]  [id number]   Location Phone: [phone]          Chief Complaint  · follow up      History Of Present Illness  Marija Miller is a 65 year old /Black female who presents for evaluation and treatment of:      Patient presents today for checkup.  She reports that she has seen ENT, Dr. Tineo who is not recommending surgery at this time.  She does have a history of osteoporosis however calcium level has been normal.  She is tolerating present grenade which she is taking for osteoporosis.  I will request records.  Her last calcium level was 9.0.  Plan to repeat this in 3 months when she returns for follow-up.  She does have an appointment to see her rheumatologist in [place] next week.  She does need a mammogram.  She denies having previous abnormal mammogram.  Her last Pap smear was in the last year or the year before.  She denies being sexually active.  Discussed repeating her Pap smear around age 67.  Her last colonoscopy was in 2017 and she was recommended to have follow-up in 2022 due to polyps.  She will continue to reside in [place] for the time being as she is following her daughter and son-in-law as they are both involved in the .  She reports that her left ear pain has resolved since starting Singulair and Flonase.       Past Medical History  Allergies; Arthritis; Cervical cancer screening; Gall Stones; Hyperlipemia; Thyroid Problems         Past Surgical History  Colonoscopy         Medication List  Allegra Allergy 60 mg oral tablet; atorvastatin 20 mg oral tablet; Calcium 500 oral; fexofenadine 180 mg oral tablet; fluticasone propionate 50 mcg/actuation nasal spray,suspension;  ipratropium bromide 0.03 % nasal spray,non-aerosol; lansoprazole 30 mg oral capsule,delayed release(DR/EC); montelukast 10 mg oral tablet; risedronate 150 mg oral tablet; Synthroid 88 mcg oral tablet; Vitamin D3 25 mcg (1,000 unit) oral capsule; Xeljanz XR 11 mg oral tablet extended release 24 hr         Allergy List  SULFA (SULFONAMIDES)         Social History  Tobacco (Former)         Review of Systems     General: Denies any fever, chills, weight changes, or night sweats  HEENT:  Denies any vision or hearing changes. Denies any neck tenderness. Denies any headaches. Denies nasal congestion  Cardiovascular: Denies any chest pain or palpitations  respiratory: Denies any cough or wheezing. Denies any shortness of breath  Gastrointestinal: Denies any nausea vomiting or diarrhea, Denies constipation  Extremities: Denies any edema  Psychiatric: Denies any changes in mood or affect  Neurologic: Denies any neurologic deficits  skin: Denies any rashes or lesions.  endocrine: Denies any weight loss, fever, night sweats  Musculoskeletal: Denies any weakness       Vitals  Date Time BP Position Site L\R Cuff Size HR RR TEMP (F) WT  HT  BMI kg/m2 BSA m2 O2 Sat HC       06/22/2020 08:39 /78 Sitting    79 - R  96.9 180lbs 0oz 5'   35.15 1.86 98 %          Physical Examination     General: AAO 3, no acute distress, pleasant  HEENT: Normocephalic, atraumatic  Cardiovascular: Regular rate and rhythm without appreciable murmur  Respiratory: Clear to auscultation bilaterally no RRW  Gastrointestinal: Soft nontender nondistended with bowel sounds present  extremities: No clubbing, cyanosis or edema  Neurologic: CN II through XII grossly intact   Psychiatric: Normal mood and affect           Assessment  · Osteoporosis     733.00/M81.0  · Visit for screening mammogram     V76.12/Z12.31  · Hyperparathyroidism     252.00/E21.3  · Medication monitoring encounter     V58.83/Z51.81  · Allergic rhinitis     477.9/J30.9  · Eustachian  tube dysfunction     381.81/H69.80  · Rheumatoid arthritis     714.0/M06.9    Problems Reconciled  Plan  · Orders  o Screening Mammography; Bilateral 3D (20722, 92976, ) - V76.12/Z12.31 - 06/22/2020  o CMP [facility] ([id number]) - 252.00/E21.3, V58.83/Z51.81 - 09/22/2020  o ACO-39: Current medications updated and reviewed () - - 06/22/2020  · Medications  o Medications have been Reconciled  o Transition of Care or Provider Policy  · Instructions  o Patient was educated/instructed on their diagnosis, treatment and medications prior to discharge from the clinic today.  o Call the office with any concerns or questions.  o Plan on seeing patient back in 3 months or sooner if needed. She is to call with any questions or concerns. We will request records from her ENTs office.  · Disposition  o Follow Up in 3 months.            Electronically Signed by: Nathan Casey DO -Author on June 22, 2020 09:15:42 AM

## 2021-05-13 NOTE — PROGRESS NOTES
Progress Note      Patient Name: Marija Miller   Patient ID: [id number]   Sex: Female   YOB: 1955    Primary Care Provider: Nathan Casey DO   Referring Provider: Nathan Casey DO    Visit Date: September 22, 2020    Provider: Nathan Casey DO   Location: [facility]   Location Address: [address] [place] [address], [address]  [place], [place]  [id number]   Location Phone: [phone]          Chief Complaint  · 3 month follow up       History Of Present Illness  Marija Miller is a 65 year old /Black female who presents for evaluation and treatment of:      Patient presents today for checkup.  She reports her allergies have been acting up.  She is taking Allegra and Singulair regularly.  She admits she could be doing better with Flonase.  She has been taking ipratropium as she has been experiencing clear drainage.  She denies any fever or chills.  She has had a lot of issues with allergies since she moved to [place] from [place].  She established care with myself back on 3/31/2020.  When I first met her there were issues and concerns about hyperparathyroidism.  She did see ENT, Dr. Tineo.  Reviewing records her scan was negative with no adenoma.  She also had a thyroid ultrasound with no masses.  She is status post radioactive iodine treatment back in the 1980s for Graves' disease.  She is taking 88 mcg of Synthroid.  It was felt that she did not have hyperparathyroidism.  Her calcium level is normal as well.  She does have osteoporosis as her DEXA scan showed that on the lumbar spine but only osteopenia in the femoral neck.  She is taking risedronate once a month as well as calcium and vitamin D supplementation.  I encouraged her to continue on current management.  We will continue check routine labs and monitor her calcium level.  I will hold off on further work-up at this time given her negative work-up by ENT.       Past Medical History  Allergies; Arthritis; Cervical  cancer screening; Gall Stones; Hyperlipemia; Thyroid Problems         Past Surgical History  Colonoscopy         Medication List  Allegra Allergy 60 mg oral tablet; atorvastatin 20 mg oral tablet; Calcium 500 oral; fexofenadine 180 mg oral tablet; fluticasone propionate 50 mcg/actuation nasal spray,suspension; ipratropium bromide 0.03 % nasal spray,non-aerosol; lansoprazole 30 mg oral capsule,delayed release(DR/EC); montelukast 10 mg oral tablet; risedronate 150 mg oral tablet; Synthroid 88 mcg oral tablet; Vitamin D3 25 mcg (1,000 unit) oral capsule; Xeljanz XR 11 mg oral tablet extended release 24 hr         Allergy List  SULFA (SULFONAMIDES)         Social History  Tobacco (Former)         Review of Systems     General: Denies any fever, chills, weight changes, or night sweats  HEENT: As discussed above  Cardiovascular: Denies any chest pain or palpitations  respiratory: Denies any cough or wheezing. Denies any shortness of breath  Gastrointestinal: Denies any nausea vomiting or diarrhea, Denies constipation  Extremities: Denies any edema  Psychiatric: Denies any changes in mood or affect  Neurologic: Denies any neurologic deficits  skin: Denies any rashes or lesions.  endocrine: Denies any weight loss, fever, night sweats  Musculoskeletal: Denies any weakness       Vitals  Date Time BP Position Site L\R Cuff Size HR RR TEMP (F) WT  HT  BMI kg/m2 BSA m2 O2 Sat FR L/min FiO2 HC       09/22/2020 11:07 /76 Sitting    84 - R  97.3 178lbs 6oz 5'   34.84 1.85 99 %            Physical Examination     General: AAO 3, no acute distress, pleasant  HEENT: Normocephalic, atraumatic, no discharge in the eyes, nasal congestion with inferior turbinates enlarged bilaterally, clear drainage noted, no oropharyngeal erythema or exudates, no cervical tenderness or lymphadenopathy, no maxillary or frontal sinus tenderness to palpation  Cardiovascular: Regular rate and rhythm without appreciable murmur  Respiratory: Clear to  auscultation bilaterally no RRW  Gastrointestinal: Soft nontender nondistended with bowel sounds present  extremities: No clubbing, cyanosis or edema  Neurologic: CN II through XII grossly intact   Psychiatric: Normal mood and affect           Assessment  · Osteoporosis     733.00/M81.0  · Vitamin D deficiency     268.9/E55.9  · Seasonal allergies     477.9/J30.2  · Allergic rhinitis     477.9/J30.9  · Medication monitoring encounter     V58.83/Z51.81  · Hypothyroid     244.9/E03.9  · HLD (hyperlipidemia)     272.4/E78.5    Problems Reconciled  Plan  · Orders  o CBC with Auto Diff [facility] ([id number]) - V58.83/Z51.81, 244.9/E03.9 - 12/22/2020  o CMP [facility] ([id number]) - V58.83/Z51.81, 733.00/M81.0, 244.9/E03.9 - 12/22/2020  o Lipid Panel [facility] ([id number]) - 272.4/E78.5 - 12/22/2020  o Thyroid Profile (THYII, 16531, 92997) - 244.9/E03.9 - 12/22/2020  o Vitamin D (25-Hydroxy) Level (98828) - 268.9/E55.9 - 12/22/2020  o ACO-39: Current medications updated and reviewed () - - 09/22/2020  o Upper Respiratory [facility] Immunocap Profile [facility] (35175) - 477.9/J30.9, V58.83/Z51.81 - 12/22/2020  · Medications  o prednisone 20 mg oral tablet   SIG: take 2 tablets by oral route once daily   DISP: (10) tablets with 0 refills  Prescribed on 09/22/2020     o Medications have been Reconciled  o Transition of Care or Provider Policy  · Instructions  o Patient was educated/instructed on their diagnosis, treatment and medications prior to discharge from the clinic today.  o Patient with worsening seasonal allergies. I will give her prednisone for the next 5 days. She is encouraged to continue taking Allegra as well as Singulair and she is encouraged to take Flonase regularly and ipratropium as needed. She has had a negative work-up for hyperparathyroidism. Plan to monitor her calcium level for now. She had it recently checked and it was within normal range. We will have her labs repeated prior to next appointment. I will check her vitamin D level as  well to ensure that there are not other causes of hyperparathyroidism such as exogenous intake of vitamin D. Patient will come back in 3 months or sooner if needed.  · Disposition  o Follow Up in 3 months.            Electronically Signed by: Nathan Casey DO - on October 13, 2020 06:13:07 PM

## 2021-05-13 NOTE — PROGRESS NOTES
Progress Note      Patient Name: Marija Miller   Patient ID: [id number]   Sex: Female   YOB: 1955        Visit Date: May 13, 2020    Provider: Nathan Casey DO   Location: [facility]   Location Address: [address] [place] [address], [address]  [id number]   Location Phone: [phone]          Chief Complaint  · 1 mo follow up       History Of Present Illness  Marija Miller is a 65 year old /Black female who presents for evaluation and treatment of:      Presents today for one-month follow-up.  She established with myself back at the end of March.  She has a history of hyperparathyroidism.  She was supposed to have a parathyroid scan done.  She reports having it done as this was ordered by the office of Dr. Tineo.  I will request the report.  She does have a history of osteoporosis which would be a reason to pursue surgery for hyperparathyroidism.  Calcium level when I checked it was within normal limits.  Discussed having her DEXA scan repeated as it has been a couple years since her last DEXA scan.  She is taking risedronate for treatment of osteoporosis.  She does have an appointment to see rheumatology for rheumatoid arthritis.  She is taking Xeljanz.  She is requesting that I provide a courtesy refill until she can get in at the end of next month.  I discussed doing this for her.  Patient reports that she is having left ear discomfort.  She reports having nasal congestion and issues with allergies.  This seems to have gotten worse since moving to [place].  She was seen back on 4/22/2020 and prescribed fluticasone and Allegra.  She has been taking Allegra but has not been taking fluticasone regularly.  I encouraged her to do so.  She is having drainage down the back of her throat which is causing a discomfort in the back of her throat.  I discussed adding ipratropium as well as Singulair.  I encourage patient to follow-up with ENT regarding her parathyroid results and if she  continues to have issues with her left ear and throat.       Past Medical History  Allergies; Arthritis; Cervical cancer screening; Gall Stones; Hyperlipemia; Thyroid Problems         Past Surgical History  Colonoscopy         Medication List  atorvastatin 20 mg oral tablet; Calcium 500 oral; fexofenadine 180 mg oral tablet; lansoprazole 30 mg oral capsule,delayed release(DR/EC); risedronate 150 mg oral tablet; Synthroid 88 mcg oral tablet; Vitamin D3 25 mcg (1,000 unit) oral capsule; Xeljanz XR 11 mg oral tablet extended release 24 hr         Allergy List  SULFA (SULFONAMIDES)         Social History  Tobacco (Former)         Review of Systems     General: Denies any fever, chills, weight changes, or night sweats.  Denies any recent sick contacts  HEENT: As discussed above.  Describes a sensation of something stuck in the back of her throat when she has the drainage.  Cardiovascular: Denies any chest pain or palpitations  respiratory: Denies any cough or wheezing. Denies any shortness of breath  Gastrointestinal: Denies any nausea vomiting or diarrhea, Denies constipation  Extremities: Denies any edema  Psychiatric: Denies any changes in mood or affect  Neurologic: Denies any neurologic deficits  skin: Denies any rashes or lesions.  endocrine: Denies any weight loss, fever, night sweats  Musculoskeletal: Denies any weakness       Vitals  Date Time BP Position Site L\R Cuff Size HR RR TEMP (F) WT  HT  BMI kg/m2 BSA m2 O2 Sat        05/13/2020 09:39 /90 Sitting    71 - R  98 178lbs 8oz 5'   34.86 1.85 98 %          Physical Examination     General: AAO 3, no acute distress, pleasant  HEENT: Normocephalic, atraumatic, no discharge in the eyes, nasal congestion with enlarged turbinates.  Septum is deviated to the left, there is oropharyngeal erythema with postnasal drip.  No exudates, and TMs intact bilaterally with no erythema, no cervical tenderness or lymphadenopathy  Cardiovascular: Regular rate and rhythm  without appreciable murmur  Respiratory: Clear to auscultation bilaterally no RRW  Gastrointestinal: Soft nontender nondistended with bowel sounds present  extremities: No clubbing, cyanosis or edema  Neurologic: CN II through XII grossly intact   Psychiatric: Normal mood and affect           Assessment  · Osteoporosis     733.00/M81.0  · Hyperparathyroidism     252.00/E21.3  · Medication monitoring encounter     V58.83/Z51.81  · Allergic rhinitis     477.9/J30.9  · Eustachian tube dysfunction     381.81/H69.80  · Rheumatoid arthritis     714.0/M06.9  · Hypothyroid     244.9/E03.9  · HLD (hyperlipidemia)     272.4/E78.5    Problems Reconciled  Plan  · Orders  o ACO-39: Current medications updated and reviewed () - - 05/13/2020  o DEXA Bone Density, 1 or more sites, axial skeleton [facility] ([id number]) - 733.00/M81.0, 252.00/E21.3 - 05/13/2020  · Medications  o fluticasone propionate 50 mcg/actuation nasal spray,suspension   SIG: spray 2 sprays (100 mcg) in each nostril by intranasal route once daily for 30 days   DISP: (1) 9.9 ml aer w/adap with 0 refills  Prescribed on 05/13/2020     o ipratropium bromide 0.03 % nasal spray,non-aerosol   SIG: spray 2 sprays in each nostril by intranasal route 3 times per day as needed for nasal drainage/congestion   DISP: (1) 30 ml canister with 3 refills  Prescribed on 05/13/2020     o montelukast 10 mg oral tablet   SIG: take 1 tablet (10 mg) by oral route once daily in the evening for allergies   DISP: (90) tablets with 1 refills  Prescribed on 05/13/2020     o atorvastatin 20 mg oral tablet   SIG: take 1 tablet (20 mg) by oral route once daily for 90 days   DISP: (90) tablet with 3 refills  Prescribed on 05/13/2020     o lansoprazole 30 mg oral capsule,delayed release(DR/EC)   SIG: take 1 capsule (30 mg) by oral route once daily before a meal for 90 days   DISP: (90) capsule with 3 refills  Prescribed on 05/13/2020     o Xeljanz XR 11 mg oral tablet extended release 24 hr   SIG:  take 1 tablet (11 mg) by oral route once daily for 90 days   DISP: (90) tablet with 0 refills  Prescribed on 05/13/2020     o Synthroid 88 mcg oral tablet   SIG: take 1 tablet (88 mcg) by oral route once daily for 90 days   DISP: (90) tablet with 3 refills  Adjusted on 05/13/2020     o Medications have been Reconciled  o Transition of Care or Provider Policy  · Instructions  o Patient was educated/instructed on their diagnosis, treatment and medications prior to discharge from the clinic today.  o Patient instructed to seek medical attention urgently for new or worsening symptoms.  o Call the office with any concerns or questions.  o Discussed getting a DEXA for further evaluation of her hyperparathyroidism. We will assess her bone density. As far as her ear discomfort is concerned I suspect is secondary to allergic rhinitis and eustachian tube dysfunction. I encouraged her to continue taking fluticasone 2 sprays in each nostril daily and to use ipratropium as needed. I will add montelukast and she will take Allegra. Patient to call with any questions or concerns. Plan on seeing her back in 1 month for follow-up.  · Disposition  o Follow Up in 1 month.            Electronically Signed by: Nathan Casey DO -Author on May 13, 2020 01:03:55 PM

## 2021-05-14 VITALS
SYSTOLIC BLOOD PRESSURE: 122 MMHG | WEIGHT: 178.37 LBS | DIASTOLIC BLOOD PRESSURE: 76 MMHG | HEART RATE: 84 BPM | TEMPERATURE: 97.3 F | HEIGHT: 60 IN | OXYGEN SATURATION: 99 % | BODY MASS INDEX: 35.02 KG/M2

## 2021-05-14 VITALS
WEIGHT: 183.37 LBS | OXYGEN SATURATION: 97 % | HEART RATE: 79 BPM | TEMPERATURE: 98.7 F | SYSTOLIC BLOOD PRESSURE: 118 MMHG | HEIGHT: 60 IN | DIASTOLIC BLOOD PRESSURE: 78 MMHG | BODY MASS INDEX: 36 KG/M2

## 2021-05-14 VITALS
HEART RATE: 93 BPM | OXYGEN SATURATION: 96 % | SYSTOLIC BLOOD PRESSURE: 140 MMHG | TEMPERATURE: 97.3 F | WEIGHT: 174 LBS | DIASTOLIC BLOOD PRESSURE: 82 MMHG | HEIGHT: 60 IN | BODY MASS INDEX: 34.16 KG/M2

## 2021-05-14 NOTE — PROGRESS NOTES
Progress Note      Patient Name: Marija Miller   Patient ID: [id number]   Sex: Female   YOB: 1955    Primary Care Provider: Nathan Casey DO   Referring Provider: Nathan Casey DO    Visit Date: January 25, 2021    Provider: Nathan Casey DO   Location: [facility] [place] [place]   Location Address: [address] [place] [address], [address] [place] [address]  [id number]   Location Phone: [phone]          Chief Complaint  · check up      History Of Present Illness  Marija Miller is a 65 year old /Black female who presents for evaluation and treatment of:      Patient presents today for checkup.  She reports overall doing well.  She is planning on moving to [facility] perhaps as early as the end of March.  Her labs were reviewed.  Thyroid level slightly off.  She is taking 88 mcg once a day.  This is for a total of 616 mcg a week.  We will drop her down to 88 mcg 6 days a week which is a total of 528 mcg.  Plan on repeating her thyroid profile before she leaves for [place].  Labs were reviewed and appropriate otherwise.  We discussed continue current management.  She will need a PA for lansoprazole which she takes for acid reflux.  We will work on getting this taken care of.       Past Medical History  Allergies; Arthritis; Cervical cancer screening; Gall Stones; Hyperlipemia; Thyroid Problems         Past Surgical History  Colonoscopy         Medication List  acyclovir 400 mg oral tablet; Allegra Allergy 60 mg oral tablet; atorvastatin 20 mg oral tablet; Calcium 500 oral; fexofenadine 180 mg oral tablet; fluticasone propionate 50 mcg/actuation nasal spray,suspension; ipratropium bromide 0.03 % nasal spray,non-aerosol; lansoprazole 30 mg oral capsule,delayed release(DR/EC); montelukast 10 mg oral tablet; risedronate 150 mg oral tablet; Synthroid 88 mcg oral tablet; Vitamin D3 25 mcg (1,000 unit) oral capsule; Xeljanz XR 11 mg oral tablet extended release 24 hr         Allergy  List  SULFA (SULFONAMIDES)       Allergies Reconciled  Social History  Tobacco (Former)         Immunizations  Name Date Admin   Influenza 12/22/2020         Review of Systems     Gen: Denies any fever, chills       Vitals  Date Time BP Position Site L\R Cuff Size HR RR TEMP (F) WT  HT  BMI kg/m2 BSA m2 O2 Sat FR L/min FiO2 HC       01/25/2021 10:14 /78 Sitting    79 - R  98.7 183lbs 6oz 5'   35.81 1.88 97 %            Physical Examination     General: AAO 3, no acute distress, pleasant  HEENT: Normocephalic, atraumatic  Cardiovascular: Regular rate and rhythm without appreciable murmur  Respiratory: Clear to auscultation bilaterally no RRW  Gastrointestinal: Soft nontender nondistended with bowel sounds present  extremities: No edema  Neurologic: CN II through XII grossly intact   Psychiatric: Normal mood and affect           Assessment  · Osteoporosis     733.00/M81.0  · Hypothyroid     244.9/E03.9      Plan  · Orders  o Thyroid Profile (28266, 70420, THYII) - 244.9/E03.9 - 03/25/2021  o ACO-14: Influenza immunization administered or previously received [facility] () - - 01/25/2021  o ACO-39: Current medications updated and reviewed (, 1159F) - - 01/25/2021  · Medications  o Synthroid 88 mcg oral tablet   SIG: take 1 tablet (88 mcg) by oral route 6 days a week, Skip sundays   DISP: (90) Tablet with 3 refills  Adjusted on 01/25/2021     · Instructions  o Patient was educated/instructed on their diagnosis, treatment and medications prior to discharge from the clinic today.  o Patient instructed to seek medical attention urgently for new or worsening symptoms.  o Call the office with any concerns or questions.  o Plan as documented above. Patient instructed to call with any questions or concerns. Discussed having thyroid profile repeated in about 2 months. She will come back and see us on an as-needed basis before leaving for [place] should any issues arise.  · Disposition  o Follow Up  PRN.            Electronically Signed by: Nathan Casey DO -Author on January 25, 2021 11:01:34 AM

## 2021-05-15 VITALS
DIASTOLIC BLOOD PRESSURE: 84 MMHG | OXYGEN SATURATION: 96 % | SYSTOLIC BLOOD PRESSURE: 138 MMHG | HEART RATE: 92 BPM | WEIGHT: 189.37 LBS | HEIGHT: 60 IN | BODY MASS INDEX: 37.18 KG/M2 | TEMPERATURE: 97.9 F

## 2021-05-15 VITALS
TEMPERATURE: 96.9 F | OXYGEN SATURATION: 98 % | HEIGHT: 60 IN | HEART RATE: 79 BPM | BODY MASS INDEX: 35.34 KG/M2 | DIASTOLIC BLOOD PRESSURE: 78 MMHG | SYSTOLIC BLOOD PRESSURE: 114 MMHG | WEIGHT: 180 LBS

## 2021-05-15 VITALS
OXYGEN SATURATION: 98 % | WEIGHT: 178.5 LBS | HEART RATE: 71 BPM | DIASTOLIC BLOOD PRESSURE: 90 MMHG | SYSTOLIC BLOOD PRESSURE: 124 MMHG | TEMPERATURE: 98 F | BODY MASS INDEX: 35.05 KG/M2 | HEIGHT: 60 IN

## 2021-05-15 VITALS
DIASTOLIC BLOOD PRESSURE: 76 MMHG | BODY MASS INDEX: 36.76 KG/M2 | WEIGHT: 187.25 LBS | HEART RATE: 93 BPM | HEIGHT: 60 IN | OXYGEN SATURATION: 97 % | SYSTOLIC BLOOD PRESSURE: 114 MMHG | TEMPERATURE: 98.1 F

## 2022-08-08 RX ORDER — ATORVASTATIN CALCIUM 20 MG/1
TABLET, FILM COATED ORAL
Qty: 90 TABLET | Refills: 0 | OUTPATIENT
Start: 2022-08-08

## 2022-08-08 NOTE — TELEPHONE ENCOUNTER
Please inform patient that she needs to get refills from her new primary care as I have not seen her in office since 1/25/2021 for management of her chronic conditions.